# Patient Record
Sex: FEMALE | Race: WHITE
[De-identification: names, ages, dates, MRNs, and addresses within clinical notes are randomized per-mention and may not be internally consistent; named-entity substitution may affect disease eponyms.]

---
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
## 2021-12-15 ENCOUNTER — HOSPITAL ENCOUNTER (EMERGENCY)
Dept: HOSPITAL 19 - COL.ER | Age: 21
Discharge: LEFT BEFORE DISCHARGE | End: 2021-12-15
Attending: FAMILY MEDICINE
Payer: COMMERCIAL

## 2021-12-15 ENCOUNTER — HOSPITAL ENCOUNTER (EMERGENCY)
Dept: HOSPITAL 19 - COL.ER | Age: 21
LOS: 1 days | Discharge: HOME | End: 2021-12-16
Payer: COMMERCIAL

## 2021-12-15 VITALS — BODY MASS INDEX: 29.68 KG/M2 | HEIGHT: 69.02 IN | WEIGHT: 200.4 LBS

## 2021-12-15 VITALS — BODY MASS INDEX: 29.68 KG/M2 | WEIGHT: 200.4 LBS | HEIGHT: 69.02 IN

## 2021-12-15 VITALS — TEMPERATURE: 98.3 F

## 2021-12-15 VITALS — HEART RATE: 130 BPM | DIASTOLIC BLOOD PRESSURE: 61 MMHG | SYSTOLIC BLOOD PRESSURE: 114 MMHG

## 2021-12-15 VITALS — TEMPERATURE: 99.5 F

## 2021-12-15 DIAGNOSIS — Z20.822: ICD-10-CM

## 2021-12-15 DIAGNOSIS — R00.0: ICD-10-CM

## 2021-12-15 DIAGNOSIS — I48.91: ICD-10-CM
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
## 2021-12-15 DIAGNOSIS — S06.0X9A: Primary | ICD-10-CM
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
## 2021-12-15 DIAGNOSIS — S06.0X0A: Primary | ICD-10-CM

## 2021-12-15 DIAGNOSIS — R65.10: ICD-10-CM

## 2021-12-15 DIAGNOSIS — D72.829: ICD-10-CM

## 2021-12-15 LAB
ALBUMIN SERPL-MCNC: 3.5 GM/DL (ref 3.5–5)
ALBUMIN SERPL-MCNC: 3.8 GM/DL (ref 3.5–5)
ALP SERPL-CCNC: 52 U/L (ref 40–150)
ALP SERPL-CCNC: 57 U/L (ref 40–150)
ALT SERPL-CCNC: 12 U/L (ref 0–55)
ALT SERPL-CCNC: 12 U/L (ref 0–55)
ANION GAP SERPL CALC-SCNC: 13 MMOL/L (ref 7–16)
ANION GAP SERPL CALC-SCNC: 14 MMOL/L (ref 7–16)
AST SERPL-CCNC: 14 U/L (ref 5–34)
AST SERPL-CCNC: 14 U/L (ref 5–34)
BASOPHILS # BLD: 0 K/MM3 (ref 0–0.2)
BASOPHILS # BLD: 0.1 K/MM3 (ref 0–0.2)
BASOPHILS NFR BLD AUTO: 0.3 % (ref 0–2)
BASOPHILS NFR BLD AUTO: 0.3 % (ref 0–2)
BILIRUB SERPL-MCNC: 0.4 MG/DL (ref 0.2–1.2)
BILIRUB SERPL-MCNC: 0.5 MG/DL (ref 0.2–1.2)
BUN SERPL-MCNC: 15 MG/DL (ref 7–19)
BUN SERPL-MCNC: 8 MG/DL (ref 7–19)
CALCIUM SERPL-MCNC: 8.9 MG/DL (ref 8.4–10.2)
CALCIUM SERPL-MCNC: 9.5 MG/DL (ref 8.4–10.2)
CHLORIDE SERPL-SCNC: 106 MMOL/L (ref 98–107)
CHLORIDE SERPL-SCNC: 109 MMOL/L (ref 98–107)
CO2 SERPL-SCNC: 18 MMOL/L (ref 22–29)
CO2 SERPL-SCNC: 18 MMOL/L (ref 22–29)
CREAT SERPL-SCNC: 0.75 MG/DL (ref 0.57–1.11)
CREAT SERPL-SCNC: 0.76 MG/DL (ref 0.57–1.11)
DRUGS UR SCN NOM: NEGATIVE NG/ML
EOSINOPHIL # BLD: 0 K/MM3 (ref 0–0.7)
EOSINOPHIL # BLD: 0.1 K/MM3 (ref 0–0.7)
EOSINOPHIL NFR BLD: 0.2 % (ref 0–4)
EOSINOPHIL NFR BLD: 0.8 % (ref 0–4)
ERYTHROCYTE [DISTWIDTH] IN BLOOD BY AUTOMATED COUNT: 13.1 % (ref 11.5–14.5)
ERYTHROCYTE [DISTWIDTH] IN BLOOD BY AUTOMATED COUNT: 13.1 % (ref 11.5–14.5)
GLUCOSE SERPL-MCNC: 112 MG/DL (ref 70–99)
GLUCOSE SERPL-MCNC: 119 MG/DL (ref 70–99)
GRANULOCYTES # BLD AUTO: 71.6 % (ref 42.2–75.2)
GRANULOCYTES # BLD AUTO: 83.3 % (ref 42.2–75.2)
HCT VFR BLD AUTO: 38.6 % (ref 35–45)
HCT VFR BLD AUTO: 40.4 % (ref 35–45)
HGB BLD-MCNC: 13 G/DL (ref 12–15)
HGB BLD-MCNC: 13.5 G/DL (ref 12–15)
LYMPHOCYTES # BLD: 1.1 K/MM3 (ref 1.2–3.4)
LYMPHOCYTES # BLD: 3.3 K/MM3 (ref 1.2–3.4)
LYMPHOCYTES NFR BLD: 19.9 % (ref 20–51)
LYMPHOCYTES NFR BLD: 9.3 % (ref 20–51)
MCH RBC QN AUTO: 28 PG (ref 26–32)
MCH RBC QN AUTO: 28 PG (ref 26–32)
MCHC RBC AUTO-ENTMCNC: 33 G/DL (ref 33–37)
MCHC RBC AUTO-ENTMCNC: 34 G/DL (ref 33–37)
MCV RBC AUTO: 82 FL (ref 80–95)
MCV RBC AUTO: 83 FL (ref 80–95)
MONOCYTES # BLD: 0.8 K/MM3 (ref 0.1–0.6)
MONOCYTES # BLD: 1.2 K/MM3 (ref 0.1–0.6)
MONOCYTES NFR BLD AUTO: 6.4 % (ref 1.7–9.3)
MONOCYTES NFR BLD AUTO: 7 % (ref 1.7–9.3)
NEUTROPHILS # BLD: 10.2 K/MM3 (ref 1.4–6.5)
NEUTROPHILS # BLD: 11.9 K/MM3 (ref 1.4–6.5)
PH UR STRIP.AUTO: 5 [PH] (ref 5–8)
PLATELET # BLD AUTO: 309 K/MM3 (ref 130–400)
PLATELET # BLD AUTO: 358 K/MM3 (ref 130–400)
PMV BLD AUTO: 10.3 FL (ref 7.4–10.4)
PMV BLD AUTO: 9.8 FL (ref 7.4–10.4)
POTASSIUM SERPL-SCNC: 3.5 MMOL/L (ref 3.5–4.5)
POTASSIUM SERPL-SCNC: 3.7 MMOL/L (ref 3.5–4.5)
PROT SERPL-MCNC: 7.2 GM/DL (ref 6.2–8.1)
PROT SERPL-MCNC: 7.8 GM/DL (ref 6.2–8.1)
RBC # BLD AUTO: 4.69 M/MM3 (ref 4.1–5.3)
RBC # BLD AUTO: 4.88 M/MM3 (ref 4.1–5.3)
RBC # UR STRIP.AUTO: (no result) /UL
RBC # UR: (no result) /HPF (ref 0–2)
SODIUM SERPL-SCNC: 138 MMOL/L (ref 136–145)
SODIUM SERPL-SCNC: 140 MMOL/L (ref 136–145)
SP GR UR STRIP.AUTO: 1.03 (ref 1–1.03)
SQUAMOUS # URNS: (no result) /HPF (ref 0–10)
TROPONIN I SERPL-MCNC: < 0.01 NG/ML (ref 0–0.03)
URN COLLECT METHOD CLASS: (no result)

## 2021-12-16 VITALS — HEART RATE: 98 BPM | SYSTOLIC BLOOD PRESSURE: 102 MMHG | DIASTOLIC BLOOD PRESSURE: 75 MMHG

## 2021-12-22 ENCOUNTER — HOSPITAL ENCOUNTER (OUTPATIENT)
Dept: HOSPITAL 19 - WSOH | Age: 21
LOS: 9 days | Discharge: HOME | End: 2021-12-31
Attending: NURSE PRACTITIONER
Payer: COMMERCIAL

## 2021-12-22 DIAGNOSIS — S06.0X0A: Primary | ICD-10-CM
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
## 2024-08-26 ENCOUNTER — HOSPITAL ENCOUNTER (OUTPATIENT)
Dept: HOSPITAL 19 - COL.ER | Age: 24
Setting detail: OBSERVATION
Discharge: HOME | End: 2024-08-26
Attending: INTERNAL MEDICINE | Admitting: INTERNAL MEDICINE
Payer: COMMERCIAL

## 2024-08-26 VITALS — SYSTOLIC BLOOD PRESSURE: 101 MMHG | DIASTOLIC BLOOD PRESSURE: 71 MMHG | HEART RATE: 109 BPM

## 2024-08-26 VITALS — DIASTOLIC BLOOD PRESSURE: 68 MMHG | SYSTOLIC BLOOD PRESSURE: 97 MMHG | HEART RATE: 124 BPM

## 2024-08-26 VITALS — HEART RATE: 118 BPM | DIASTOLIC BLOOD PRESSURE: 54 MMHG | SYSTOLIC BLOOD PRESSURE: 97 MMHG | TEMPERATURE: 97.9 F

## 2024-08-26 VITALS — SYSTOLIC BLOOD PRESSURE: 114 MMHG | HEART RATE: 111 BPM | DIASTOLIC BLOOD PRESSURE: 78 MMHG

## 2024-08-26 VITALS — DIASTOLIC BLOOD PRESSURE: 67 MMHG | HEART RATE: 80 BPM | SYSTOLIC BLOOD PRESSURE: 99 MMHG

## 2024-08-26 VITALS — WEIGHT: 224.65 LBS | BODY MASS INDEX: 33.27 KG/M2 | HEIGHT: 69.02 IN

## 2024-08-26 VITALS — HEART RATE: 106 BPM | SYSTOLIC BLOOD PRESSURE: 97 MMHG | DIASTOLIC BLOOD PRESSURE: 69 MMHG

## 2024-08-26 VITALS — SYSTOLIC BLOOD PRESSURE: 118 MMHG | DIASTOLIC BLOOD PRESSURE: 82 MMHG | HEART RATE: 71 BPM

## 2024-08-26 VITALS — DIASTOLIC BLOOD PRESSURE: 63 MMHG | HEART RATE: 103 BPM | SYSTOLIC BLOOD PRESSURE: 99 MMHG

## 2024-08-26 VITALS — HEART RATE: 111 BPM | SYSTOLIC BLOOD PRESSURE: 98 MMHG | DIASTOLIC BLOOD PRESSURE: 72 MMHG

## 2024-08-26 VITALS — DIASTOLIC BLOOD PRESSURE: 69 MMHG | SYSTOLIC BLOOD PRESSURE: 102 MMHG | HEART RATE: 91 BPM

## 2024-08-26 VITALS — SYSTOLIC BLOOD PRESSURE: 101 MMHG | HEART RATE: 120 BPM | DIASTOLIC BLOOD PRESSURE: 62 MMHG

## 2024-08-26 VITALS — SYSTOLIC BLOOD PRESSURE: 102 MMHG

## 2024-08-26 DIAGNOSIS — Z79.899: ICD-10-CM

## 2024-08-26 DIAGNOSIS — F41.9: ICD-10-CM

## 2024-08-26 DIAGNOSIS — I48.91: Primary | ICD-10-CM

## 2024-08-26 LAB
ALBUMIN SERPL-MCNC: 3.8 G/DL (ref 3.5–5)
ALP SERPL-CCNC: 68 U/L (ref 40–150)
ALT SERPL-CCNC: 36 U/L (ref 0–55)
ANION GAP SERPL CALC-SCNC: 13 MMOL/L (ref 7–16)
APPEARANCE UR: CLEAR
AST SERPL-CCNC: 22 U/L (ref 5–34)
BASOPHILS # BLD: 0.1 K/MM3 (ref 0–0.2)
BASOPHILS NFR BLD AUTO: 0.8 % (ref 0–2)
BILIRUB SERPL-MCNC: 0.3 MG/DL (ref 0.2–1.2)
BUN SERPL-MCNC: 13 MG/DL (ref 7–19)
CALCIUM SERPL-MCNC: 8.8 MG/DL (ref 8.4–10.2)
CHLORIDE SERPL-SCNC: 109 MEQ/L (ref 98–107)
COLOR UR AUTO: YELLOW
CREAT SERPL-SCNC: 0.73 MG/DL (ref 0.57–1.11)
EOSINOPHIL # BLD: 0.1 K/MM3 (ref 0–0.7)
EOSINOPHIL NFR BLD: 0.9 % (ref 0–4)
ERYTHROCYTE [DISTWIDTH] IN BLOOD BY AUTOMATED COUNT: 14.2 % (ref 11.5–14.5)
GLUCOSE SERPL-MCNC: 118 MG/DL (ref 70–99)
GLUCOSE UR QL STRIP.AUTO: NEGATIVE
GRANULOCYTES # BLD AUTO: 58.7 % (ref 42.2–75.2)
HCT VFR BLD AUTO: 38.7 % (ref 37–47)
HGB BLD-MCNC: 12.3 G/DL (ref 12.5–16)
KETONES UR STRIP.AUTO-MCNC: NEGATIVE MG/DL
LYMPHOCYTES # BLD: 3.4 K/MM3 (ref 1.2–3.4)
LYMPHOCYTES NFR BLD: 31.1 % (ref 20–51)
MAGNESIUM SERPL-MCNC: 1.6 MG/DL (ref 1.6–2.6)
MCH RBC QN AUTO: 27 PG (ref 27–31)
MCHC RBC AUTO-ENTMCNC: 32 G/DL (ref 33–37)
MCV RBC AUTO: 86 FL (ref 80–100)
MONOCYTES # BLD: 0.9 K/MM3 (ref 0.1–0.6)
MONOCYTES NFR BLD AUTO: 8.1 % (ref 1.7–9.3)
NEUTROPHILS # BLD: 6.4 K/MM3 (ref 1.4–6.5)
NITRATE UR-MCNC: NEGATIVE UG/ML
PH UR STRIP.AUTO: 7 [PH] (ref 5–8.5)
PLATELET # BLD AUTO: 317 K/MM3 (ref 130–400)
PMV BLD AUTO: 9.8 FL (ref 7.4–10.4)
POTASSIUM SERPL-SCNC: 3.9 MEQ/L (ref 3.5–4.5)
PROT SERPL-MCNC: 7.2 G/DL (ref 6.2–8.1)
RBC # BLD AUTO: 4.52 M/MM3 (ref 4.1–5.3)
RBC # UR STRIP.AUTO: NEGATIVE /UL
SODIUM SERPL-SCNC: 141 MEQ/L (ref 136–145)
SP GR UR STRIP.AUTO: 1.01 (ref 1–1.03)
TROPONIN I SERPL-MCNC: < 0.01 NG/ML (ref 0–0.03)
UA DIPSTICK PNL UR STRIP.AUTO: NEGATIVE
URN COLLECT METHOD CLASS: (no result)
UROBILINOGEN UR STRIP.AUTO-MCNC: 0.2 E.U/DL (ref 0.2–1)

## 2024-08-26 PROCEDURE — G0378 HOSPITAL OBSERVATION PER HR: HCPCS

## 2024-08-26 NOTE — NUR
Notified by tele tech that patient converted to sinus rhythm. Amanda MEYER with
cardiology notified.

## 2024-08-26 NOTE — NUR
Patient up to medical unit from ED. Patient c/o mild chest pain, palpitations
and dizziness. VSS. Family at the bedside. Bed in lowest position with call
light within reach.

## 2024-08-26 NOTE — NUR
Patient discharged to home, picked up by boyfriend. Educated on discharge
instructions and new medications, pt verbalized understanding. All belongings
sent home with patient. Assisted out to car by nursing staff. IV removed prior
to discharge, tip intact. Telemetry removed prior to discharge.

## 2024-08-26 NOTE — NUR
[facility]OR[facility] met with patient and her boyfriend to complete initial assessment for
discharge planning.  Patient verified that she lives in [place] with her
boyfriend Killian Alexander ([phone]).  She lists her mother Felicia ([phone])
as contact.  Patient sees Dr. Anthony Simmons as her PCP and uses [facility]
pharmacy.  Patient works as a  and denies using any DME.  She
denies having a DPOA and declines to complete one.  No discharge needs
identified.
 
Discharge plan:  Home